# Patient Record
Sex: MALE | Race: WHITE
[De-identification: names, ages, dates, MRNs, and addresses within clinical notes are randomized per-mention and may not be internally consistent; named-entity substitution may affect disease eponyms.]

---

## 2018-02-08 ENCOUNTER — HOSPITAL ENCOUNTER (OUTPATIENT)
Dept: HOSPITAL 45 - C.MRI | Age: 28
Discharge: HOME | End: 2018-02-08
Attending: ORTHOPAEDIC SURGERY
Payer: COMMERCIAL

## 2018-02-08 DIAGNOSIS — M54.16: Primary | ICD-10-CM

## 2018-02-08 NOTE — DIAGNOSTIC IMAGING REPORT
LUMBAR SPINE W/O CONTRAST



CLINICAL HISTORY: 27 years-old Male with RADICULOATHY, LUMBAR REGION.  Subacute

low back pain with prior trauma with radicular symptoms of the bilateral lower

extremities



COMPARISON: None.



TECHNIQUE: Multiplanar, multi sequence MRI of the lumbar spine was performed

without intravenous contrast. 



FINDINGS: 



No focal bone marrow edema, acute fracture or subluxation. 5 mm synovial cyst of

the left L3-L4 facet. 6 mm synovial facet is seen adjacent to the left L5-S1

facet. Discogenic degenerative changes seen at L4-L5 and L5-S1 as below. Signal

within the spinal cord appears normal. Cauda equina are within normal limits.

The imaged paraspinal, intra-abdominal and intrapelvic structures demonstrate no

acute abnormality. No pathologic adenopathy or aortic aneurysm identified.



T12-L1:  No central canal or neural foraminal stenosis.



L1-L2:  No central canal or neural foraminal stenosis.



L2-L3:  No central canal or neural foraminal stenosis.



L3-L4:  Mild intervertebral disc space narrowing with disc desiccation. No

central canal or foraminal narrowing.



L4-L5:  Mild intervertebral disc space narrowing with disc desiccation. Small

circumferential annular disc bulge is noted in conjunction with superimposed

right paracentral/right lateral recess disc extrusion which measures 1.4 x 0.6 x

1.3 cm in transverse, AP and craniocaudal dimensions respectively. This extends

3 mm superior to the inferior endplate of L4, abutting and displacing the right

L5 nerve root and causes mild central canal, severe right lateral recess and

mild bilateral foraminal narrowing.



L5-S1:  Mild intervertebral disc space narrowing with disc desiccation. Small

posterior disc bulge with annular fissure and broad-based central disc

protrusion which flattens the ventral thecal sac and contributes to mild

bilateral foraminal narrowing. No significant central canal stenosis.





IMPRESSION:  

1. Discogenic degenerative changes at L4-L5 with small circumferential annular

disc bulge and right paracentral/right lateral recess extrusion causes mild

central canal, severe right lateral recess and mild bilateral foraminal

narrowing abutting and displacing the right L5 nerve root.



2. Small posterior disc bulge with annular fissure and broad-based central disc

protrusion at L5-S1 contributes to mild bilateral foraminal narrowing without

significant central canal stenosis.



3. Mild intervertebral disc space narrowing with disc desiccation at L3-L4.







The above report was generated using voice recognition software. It may contain

grammatical, syntax or spelling errors.







Electronically signed by:  Lamin Fang M.D.

2/8/2018 8:13 AM



Dictated Date/Time:  2/8/2018 8:01 AM

## 2018-03-15 ENCOUNTER — HOSPITAL ENCOUNTER (OUTPATIENT)
Dept: HOSPITAL 45 - X.SURG | Age: 28
Discharge: HOME | End: 2018-03-15
Attending: PHYSICAL MEDICINE & REHABILITATION
Payer: COMMERCIAL

## 2018-03-15 VITALS
WEIGHT: 230.49 LBS | WEIGHT: 230.49 LBS | BODY MASS INDEX: 31.22 KG/M2 | HEIGHT: 72.01 IN | BODY MASS INDEX: 31.22 KG/M2 | HEIGHT: 72.01 IN

## 2018-03-15 VITALS — SYSTOLIC BLOOD PRESSURE: 135 MMHG | OXYGEN SATURATION: 97 % | DIASTOLIC BLOOD PRESSURE: 75 MMHG | HEART RATE: 78 BPM

## 2018-03-15 DIAGNOSIS — M51.16: Primary | ICD-10-CM

## 2018-03-15 NOTE — OPERATIVE REPORT
DATE OF OPERATION:  03/15/2018

 

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

 

POSTOPERATIVE DIAGNOSIS:  Same.

 

PROCEDURE:  Right paramedian L5-S1 intralaminar epidural steroid injection

under fluoroscopic guidance.

 

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

[employer] football coaching staff who has a significant radicular pain that

is functionally limiting to him and a recent MRI reveals an L4-L5 disc

protrusion narrowing the foramen and coming close contact with the right L5

nerve root.  He has failed conservative treatment and presents today for

a lumbar epidural steroid injection.

 

PHYSICAL EXAMINATION:  The patient has a loss of normal lumbar lordosis.  He

has soreness to palpation bilaterally, increased pain with flexion and has

radicular symptoms down the right leg.

 

CONSENT:  Verbal and written consent was obtained from the patient.  Risks

and benefits were reviewed.  Risks include but are not limited to epidural

abscess, epidural hematoma, allergic reaction, dural puncture.  The patient

wishes to proceed.

 

DESCRIPTION OF PROCEDURE:  The patient was taken back to the special

procedures room of the [facility] where he was maintained

in a prone position.  Backside was cleansed with Betadine x3 and a dry

sterile dressing was applied.  Fluoroscope was used to identify the L5-S1

intralaminar space.  Overlying skin on the right side was anesthetized with 4

mL of lidocaine 1% with a 25-gauge 1.5-inch needle.  A 22-gauge 3.5-inch

Tuohy needle was then directed towards the intralaminar space and advanced

under lateral fluoroscopic guidance.  Loss of resistance was noted at a depth

of 7.5 cm.  Isovue-300 contrast of 1 mL demonstrated epidural uptake pattern,

which was confirmed with both AP and lateral views.  He then underwent

injection after negative aspiration of 40 mg of Depo-Medrol, 4 mL of

preservative free sodium chloride slowly as he had reproduction of radicular

symptoms down the leg.

 

DISPOSITION:

1.  The patient is taken out into the discharge recovery area where he will

be discharged home once discharge criteria have been met.

2.  Follow up in the [facility] office in 4 weeks' time.

 

 

I attest to the content of the Intraoperative Record and any orders documented 
therein. Any exceptions are noted below.

 

 

 

TRAE

## 2018-03-15 NOTE — [facility] POST OPERATIVE BRIEF NOTE
Immediate Operative Summary


Operative Date


Mar 15, 2018.





Pre-Operative Diagnosis





RIGHT LUMBAR RADICULITIS NOT RESPONDING TO PHYSICAL THERAPY OR MEDS





Post-Operative Diagnosis





RIGHT LUMBAR RADICULITIS NOT RESPONDING TO PHYSICAL THERAPY OR MEDS





Procedure(s) Performed





LUMBAR EPIDURAL STEROID INJECTION





Surgeon


DR. SILVERIO HORNE





Assistant Surgeon(s)


None





Estimated Blood Loss


NONE





Findings


Consistent with Post-Op Diagnosis





Specimens





None





Drains


None





Anesthesia Type


Local





Complication(s)


none





Disposition


Disposition:

## 2021-08-25 NOTE — DISCHARGE INSTRUCTIONS
Discharge Instructions


Date of Service


Mar 15, 2018.





Visit


Reason for Visit:  Lumbar Radiculopathy





Discharge


Discharge Diagnosis / Problem:  right leg pain





Discharge Goals


Goal(s):  Decrease discomfort, Improve function





Activity Recommendations


Activity Limitations:  resume your previous activity





Anesthesia


.





Post Anesthesia Instructions:





If you have had General Anesthesia or IV Sedation:





*  Do not drive today.


*  Resume driving when surgeon permits.


*  Do not make important decisions or sign legal documents today.


*  Call surgeon for:





   1.  Temperature elevations greater than 101 degrees F.


   2.  Uncontrollable pain.


   3.  Excessive bleeding.


   4.  Persistent nausea and vomiting.


   5.  Medication intolerance (nausea, vomiting or rash).





*  For nausea and vomiting use only clear liquids such as: tea, soda, bouillon 

until nausea subsides, then gradually increase diet as tolerated.





*  If you have any concerns or questions, call your surgeon's office.  If 

physician is unavailable and it is an emergency, call 911 or go to the nearest 

emergency room.





.





Diet Recommendations


Recommended Home Diet:  resume previous diet





Procedures


Procedures Performed:  


LUMBAR EPIDURAL STEROID INJECTION





Pending Studies


Studies pending at discharge:  no





Medical Emergencies








.


Who to Call and When:





Medical Emergencies:  If at any time you feel your situation is an emergency, 

please call 911 immediately.





.





Non-Emergent Contact


Non-Emergency issues call your:  Specialist





.


.








"Provider Documentation" section prepared by Larry Goins.








.
No